# Patient Record
Sex: MALE | Race: WHITE
[De-identification: names, ages, dates, MRNs, and addresses within clinical notes are randomized per-mention and may not be internally consistent; named-entity substitution may affect disease eponyms.]

---

## 2018-03-25 ENCOUNTER — HOSPITAL ENCOUNTER (EMERGENCY)
Dept: HOSPITAL 17 - NEPE | Age: 33
Discharge: HOME | End: 2018-03-25
Payer: COMMERCIAL

## 2018-03-25 VITALS
RESPIRATION RATE: 14 BRPM | DIASTOLIC BLOOD PRESSURE: 71 MMHG | HEART RATE: 102 BPM | TEMPERATURE: 98.4 F | SYSTOLIC BLOOD PRESSURE: 129 MMHG | OXYGEN SATURATION: 96 %

## 2018-03-25 VITALS — WEIGHT: 264.55 LBS | BODY MASS INDEX: 35.83 KG/M2 | HEIGHT: 72 IN

## 2018-03-25 VITALS
OXYGEN SATURATION: 99 % | HEART RATE: 102 BPM | RESPIRATION RATE: 18 BRPM | SYSTOLIC BLOOD PRESSURE: 106 MMHG | DIASTOLIC BLOOD PRESSURE: 59 MMHG

## 2018-03-25 DIAGNOSIS — Z23: ICD-10-CM

## 2018-03-25 DIAGNOSIS — I10: ICD-10-CM
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
## 2018-03-25 DIAGNOSIS — S01.01XA: Primary | ICD-10-CM
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
## 2018-03-25 DIAGNOSIS — F10.129: ICD-10-CM

## 2018-03-25 LAB
BUN SERPL-MCNC: 11 MG/DL (ref 7–18)
CALCIUM SERPL-MCNC: 8.4 MG/DL (ref 8.5–10.1)
CHLORIDE SERPL-SCNC: 104 MEQ/L (ref 98–107)
CREAT SERPL-MCNC: 1.02 MG/DL (ref 0.6–1.3)
GFR SERPLBLD BASED ON 1.73 SQ M-ARVRAT: 83 ML/MIN (ref 89–?)
GLUCOSE SERPL-MCNC: 131 MG/DL (ref 74–106)
HCO3 BLD-SCNC: 24.1 MEQ/L (ref 21–32)
SODIUM SERPL-SCNC: 139 MEQ/L (ref 136–145)

## 2018-03-25 PROCEDURE — 12002 RPR S/N/AX/GEN/TRNK2.6-7.5CM: CPT

## 2018-03-25 PROCEDURE — 80048 BASIC METABOLIC PNL TOTAL CA: CPT

## 2018-03-25 PROCEDURE — 90471 IMMUNIZATION ADMIN: CPT

## 2018-03-25 PROCEDURE — 80307 DRUG TEST PRSMV CHEM ANLYZR: CPT

## 2018-03-25 PROCEDURE — 99284 EMERGENCY DEPT VISIT MOD MDM: CPT

## 2018-03-25 PROCEDURE — 70450 CT HEAD/BRAIN W/O DYE: CPT

## 2018-03-25 PROCEDURE — 72125 CT NECK SPINE W/O DYE: CPT

## 2018-03-25 PROCEDURE — 90714 TD VACC NO PRESV 7 YRS+ IM: CPT

## 2018-03-25 PROCEDURE — 96374 THER/PROPH/DIAG INJ IV PUSH: CPT

## 2018-03-25 NOTE — PD
Physical Exam


Date Seen by Provider:  Mar 25, 2018


Time Seen by Provider:  07:17


Narrative


33-year-old  male previously seen by Dr. Cat, has a laceration 

to the posterior scalp requiring closure.  I was asked to close it.  Please see 

procedure note.





Data


Data


Last Documented VS





Vital Signs








  Date Time  Temp Pulse Resp B/P (MAP) Pulse Ox O2 Delivery O2 Flow Rate FiO2


 


3/25/18 06:00  102 18 106/59 (75) 99 Nasal Cannula 2.00 


 


3/25/18 04:05 98.4       








Orders





 Orders


Ct Brain W/O Iv Contrast(Rout) (3/25/18 )


Ct Cerv Spine W/O Contrast (3/25/18 )


Basic Metabolic Panel (Bmp) (3/25/18 04:03)


Alcohol (Ethanol) (3/25/18 04:03)


Tetanus/Diphtheria Tox Adult (Tetanus/Di (3/25/18 04:15)


Ondansetron Inj (Zofran Inj) (3/25/18 04:15)


Sodium Chlor 0.9% 1000 Ml Inj (Ns 1000 M (3/25/18 04:15)


Ed Discharge Order (3/25/18 05:57)


Lidocai-Epi 1%-1:100,000 Inj (Xylocaine- (3/25/18 07:15)





Labs





Laboratory Tests








Test


  3/25/18


04:10


 


Blood Urea Nitrogen 11 MG/DL 


 


Creatinine 1.02 MG/DL 


 


Random Glucose 131 MG/DL 


 


Calcium Level 8.4 MG/DL 


 


Sodium Level 139 MEQ/L 


 


Potassium Level 3.7 MEQ/L 


 


Chloride Level 104 MEQ/L 


 


Carbon Dioxide Level 24.1 MEQ/L 


 


Anion Gap 11 MEQ/L 


 


Estimat Glomerular Filtration


Rate 83 ML/MIN 


 


 


Ethyl Alcohol Level 275 MG/DL 











[facility]


Medical Record Reviewed:  Yes


Supervised Visit with DACIA:  Yes


Procedures


**Procedure Narrative**


LACERATION


LOCATION: Upper posterior scalp


LENGTH: 3 cm


NUMBER OF STITCHES/STAPLES: 9 staples





REPAIR: The area of the laceration was prepped with Betadine and sterilely 

draped.  The laceration was infiltrated with 3 mL's 1% lidocaine with epi..  

The wound was copiously irrigated and explored without evidence of foreign body

, tendon injury or neurovascular injury.  The wound was closed using staples. 

This was a single layer repair. The patient was advised to keep the wound clean 

and dry. Patient tolerated the procedure well.  Staples should be removed 

within a week.





Diagnosis





 Primary Impression:  


 Closed head injury


 Qualified Codes:  S09.90XA - Unspecified injury of head, initial encounter


 Additional Impressions:  


 Alcohol intoxication


 Qualified Codes:  F10.929 - Alcohol use, unspecified with intoxication, 

unspecified


 Scalp laceration


 Qualified Codes:  S01.01XA - Laceration without foreign body of scalp, initial 

encounter


Patient Instructions:  General Instructions


Departure Forms:  Tests/Procedures





***Additional Instruction:  


Wound care instructions.  Staples removed in 7 days.  Polysporin twice a day.  

Decrease alcohol intake.  Please provide the patient a copy of his blood work 

and CT results at discharge.  Return if symptoms worsen or progress.


Disposition:  01 DISCHARGE HOME


Condition:  Stable











Solo Batista Mar 25, 2018 07:20

## 2018-03-25 NOTE — RADRPT
EXAM DATE/TIME:  03/25/2018 04:30 

 

[facility] COMPARISON:     No previous studies available for comparison.

 

INDICATIONS :     Trauma; fall.

                      

RADIATION DOSE:     45.68 CTDIvol (mGy) ; Patient body habitus

 

 

MEDICAL HISTORY :     None  

SURGICAL HISTORY :      None. 

ENCOUNTER:      Initial

ACUITY:      1 day

PAIN SCALE:      Non-responsive

LOCATION:        neck 

 

TECHNIQUE:     Volumetric scanning of the cervical spine was performed. Multiplanar reconstructions i
n the sagittal, coronal and oblique axial planes were performed.   Using automated exposure control a
nd adjustment of the mA and/or kV according to patient size, radiation dose was kept as low as reason
ably achievable to obtain optimal diagnostic quality images.   DICOM format image data is available e
lectronically for review and comparison.  

 

FINDINGS:

The sagittal reconstructions demonstrate normal alignment and normal prevertebral soft tissues. The d
ens is intact and there is a normal atlantoaxial relationship.

 

The axial images demonstrate that the vertebral bodies and posterior elements are intact. The soft ti
ssues are within normal limits. There is no evidence of acute fracture or malalignment.

 

CONCLUSION:          Negative trauma CT.

 

 Olvin Roberto MD on March 25, 2018 at 4:52           

Board Certified Radiologist.

 This report was verified electronically.

## 2018-03-25 NOTE — RADRPT
EXAM DATE/TIME:  03/25/2018 04:30 

 

[facility] COMPARISON:     

No previous studies available for comparison.

 

 

INDICATIONS :     

Trauma; fall.

                      

 

RADIATION DOSE:     

49.07 CTDIvol (mGy) 

 

 

 

MEDICAL HISTORY :     

None  

 

SURGICAL HISTORY :      

None. 

 

ENCOUNTER:      

Initial

 

ACUITY:      

1 day

 

PAIN SCALE:      

Non-responsive

 

LOCATION:        

cranial 

 

TECHNIQUE:     

Multiple contiguous axial images were obtained of the head.  Using automated exposure control and adj
ustment of the mA and/or kV according to patient size, radiation dose was kept as low as reasonably a
chievable to obtain optimal diagnostic quality images.   DICOM format image data is available electro
nically for review and comparison.  

 

FINDINGS:     

 

CEREBRUM:     

The ventricles are normal for age.  No evidence of midline shift, mass lesion, hemorrhage or acute in
farction.  No extra-axial fluid collections are seen.

 

POSTERIOR FOSSA:     

The cerebellum and brainstem are intact.  The 4th ventricle is midline.  The cerebellopontine angle i
s unremarkable.

 

EXTRACRANIAL:     

The visualized portion of the orbits is intact. Mucosal thickening is noted in the ethmoidal air cell
s.

 

SKULL:     

The calvaria is intact.  No evidence of skull fracture.

 

CONCLUSION:     Negative noncontrast head CT. 

 

 

 Olvin Roberto MD on March 25, 2018 at 4:41           

Board Certified Radiologist.

 This report was verified electronically.

## 2018-03-25 NOTE — PD
HPI


Chief Complaint:  Alcohol/Drug Intoxication


Time Seen by Provider:  04:03


Travel History


International Travel<30 days:  No


Contact w/Intl Traveler<30days:  No


Traveled to known affect area:  No





History of Present Illness


HPI


The patient is a 33-year-old  male who presents to the emergency 

department via EMS for alcohol intoxication and head injury.  According to EMS 

the patient was out with friends earlier tonight, drinking alcohol, when he 

apparently fell backwards striking his head.  EMS states that were bystanders 

on scene and stated the patient did not lose consciousness.  However, EMS 

states in route to the hospital the patient started vomiting.  They noted his 

initial GCS was 14.  Upon arrival the patient was actively vomiting, was 

somewhat lethargic but arousable.  Patient does complain of mild headache and 

nausea with vomiting, denies any chest pain, neck pain, shortness breath, or 

abdominal pain.  Symptoms are moderate.





PFSH


Past Medical History


Arthritis:  No


Asthma:  No


Autoimmune Disease:  No


Blood Disorders:  No


Anxiety:  No


Depression:  No


Heart Rhythm Problems:  No


Cancer:  No


Cardiovascular Problems:  No


High Cholesterol:  No


Chemotherapy:  No


Chest Pain:  No


Congestive Heart Failure:  No


COPD:  No


Cerebrovascular Accident:  No


Diabetes:  No


Diminished Hearing:  No


Endocrine:  No


Gastrointestinal Disorders:  Yes


GERD:  No


Glaucoma:  No


Genitourinary:  Yes


Headaches:  No


Hepatitis:  No


Hiatal Hernia:  No


Hypertension:  Yes


Immune Disorder:  No


Kidney Stones:  Yes (possible kidney stones while in highschool)


Musculoskeletal:  Yes (malignant hyperthermia)


Neurologic:  No


Psychiatric:  No


Reproductive:  No


Respiratory:  No


Migraines:  No


Myocardial Infarction:  No


Radiation Therapy:  No


Renal Failure:  No


Seizures:  No


Sickle Cell Disease:  No


Sleep Apnea:  No


Thyroid Disease:  No


Ulcer:  No


Tetanus Vaccination:  Unknown





Past Surgical History


Abdominal Surgery:  No


AICD:  No


Appendectomy:  No


Arteriovenous Shunt:  No


Cardiac Surgery:  No


Cholecystectomy:  No


Ear Surgery:  No


Endocrine Surgery:  No


Eye Surgery:  No


Genitourinary Surgery:  Yes (cystoscopy while in highschool)


Insulin Pump:  No


Joint Replacement:  No


Neurologic Surgery:  No


Oral Surgery:  No


Pacemaker:  No


Thoracic Surgery:  No


Other Surgery:  Yes (MUSCLE BIOPSY 1990'S)





Social History


Alcohol Use:  Yes (12 pack a week)


Tobacco Use:  No


Substance Use:  No





Allergies-Medications


(Allergen,Severity, Reaction):  


Coded Allergies:  


     Sulfa (Sulfonamide Antibiotics) (Unverified  Allergy, Severe, UNKNOWN, 3/25

/18)


     cefaclor (Unverified  Allergy, Severe, UNKNOWN, 3/25/18)


     desflurane (Unverified  Allergy, Severe, 3/25/18)


 FATHER HAD AN EPISODE OF MALIGNANT HYPERTHERMIA, PT TEST


 INCONCLUSIVE


     etomidate (Unverified  Allergy, Severe, 3/25/18)


 FATHER HAD AN EPISODE OF MALIGNANT HYPERTHERMIA, PT TEST


 INCONCLUSIVE


     isoflurane (Unverified  Allergy, Severe, 3/25/18)


 FATHER HAD AN EPISODE OF MALIGNANT HYPERTHERMIA, PT TEST


 INCONCLUSIVE


     ketamine (Unverified  Allergy, Severe, 3/25/18)


 FATHER HAD AN EPISODE OF MALIGNANT HYPERTHERMIA, PT TEST


 INCONCLUSIVE


     metaxalone (Unverified  Allergy, Severe, UNKNOWN, 3/25/18)


     methohexital (Unverified  Allergy, Severe, 3/25/18)


 FATHER HAD AN EPISODE OF MALIGNANT HYPERTHERMIA, PT TEST


 INCONCLUSIVE


     nitrous oxide (Unverified  Allergy, Severe, 3/25/18)


 FATHER HAD AN EPISODE OF MALIGNANT HYPERTHERMIA, PT TEST


 INCONCLUSIVE


     propofol (Unverified  Allergy, Severe, 3/25/18)


 FATHER HAD AN EPISODE OF MALIGNANT HYPERTHERMIA, PT TEST


 INCONCLUSIVE


     sevoflurane (Unverified  Allergy, Severe, 3/25/18)


 FATHER HAD AN EPISODE OF MALIGNANT HYPERTHERMIA, PT TEST


 INCONCLUSIVE


     shellfish derived (Unverified  Allergy, Severe, THROAT CLOSES, 3/25/18)


     succinylcholine (Unverified  Allergy, Severe, MALIGNANT HYPERTHERMIA, 3/25/

18)


     sufentanil (Unverified  Allergy, Severe, 3/25/18)


 FATHER HAD AN EPISODE OF MALIGNANT HYPERTHERMIA, PT TEST


 INCONCLUSIVE


Reported Meds & Prescriptions





Reported Meds & Active Scripts


Active


Reported


Lisinopril 10 Mg Tab Unknown Dose PO DAILY








Review of Systems


Except as stated in HPI:  all other systems reviewed are Neg


HENT:  Positive: Headaches, No: Neck Pain


Cardiovascular:  No: Chest Pain or Discomfort


Respiratory:  No: Shortness of Breath


Gastrointestinal:  Positive: Nausea, Vomiting, No: Abdominal Pain


Musculoskeletal:  No: Weakness


Neurologic:  No: Weakness


Psychiatric:  Positive: Substance Abuse (alcohol use tonight)





Physical Exam


Narrative


GENERAL: 33-year-old male who appears his stated age and is initially evaluated 

with cervical collar in place.


SKIN: 3 cm transverse laceration to the occipital area with skin avulsion noted.


HEAD: 3 cm transverse laceration to the occipital area with skin avulsion. 


EYES: Pupils equal and round.  3 mm bilateral and reactive.


ENT: No nasal bleeding or discharge.  Breath smells of alcohol.


NECK: Trachea midline. No JVD.  Cervical collar in place.


CARDIOVASCULAR: Regular rate and rhythm.  No murmur appreciated.


RESPIRATORY: No accessory muscle use. Clear to auscultation. Breath sounds 

equal bilaterally. 


GASTROINTESTINAL: Abdomen soft, non-tender, nondistended.  No rebound 

tenderness.


MUSCULOSKELETAL: No obvious deformities. No clubbing.  No cyanosis.  No edema. 


Back: No tenderness over the thoracic or lumbar vertebrae.


NEUROLOGICAL: Awake, lethargic but arousable.  Moves all 4 extremities.  

Oriented to person.


PSYCHIATRIC: Appears intoxicated.





Data


Data


Last Documented VS





Vital Signs








  Date Time  Temp Pulse Resp B/P (MAP) Pulse Ox O2 Delivery O2 Flow Rate FiO2


 


3/25/18 04:05 98.4 102 14 129/71 (90) 96   








Orders





 Orders


Ct Brain W/O Iv Contrast(Rout) (3/25/18 )


Ct Cerv Spine W/O Contrast (3/25/18 )


Basic Metabolic Panel (Bmp) (3/25/18 04:03)


Alcohol (Ethanol) (3/25/18 04:03)


Tetanus/Diphtheria Tox Adult (Tetanus/Di (3/25/18 04:15)


Ondansetron Inj (Zofran Inj) (3/25/18 04:15)


Sodium Chlor 0.9% 1000 Ml Inj (Ns 1000 M (3/25/18 04:15)





Labs





Laboratory Tests








Test


  3/25/18


04:10


 


Blood Urea Nitrogen 11 MG/DL 


 


Creatinine 1.02 MG/DL 


 


Random Glucose 131 MG/DL 


 


Calcium Level 8.4 MG/DL 


 


Sodium Level 139 MEQ/L 


 


Potassium Level 3.7 MEQ/L 


 


Chloride Level 104 MEQ/L 


 


Carbon Dioxide Level 24.1 MEQ/L 


 


Anion Gap 11 MEQ/L 


 


Estimat Glomerular Filtration


Rate 83 ML/MIN 


 


 


Ethyl Alcohol Level 275 MG/DL 











[facility]


Medical Decision Making


Medical Screen Exam Complete:  Yes


Emergency Medical Condition:  Yes


Medical Record Reviewed:  Yes


Interpretation(s)


CT of the brain reveals negative noncontrast head CT


CT of the cervical spine is negative








Laboratory Tests








Test


  3/25/18


04:10


 


Blood Urea Nitrogen 11 MG/DL 


 


Creatinine 1.02 MG/DL 


 


Random Glucose 131 MG/DL 


 


Calcium Level 8.4 MG/DL 


 


Sodium Level 139 MEQ/L 


 


Potassium Level 3.7 MEQ/L 


 


Chloride Level 104 MEQ/L 


 


Carbon Dioxide Level 24.1 MEQ/L 


 


Anion Gap 11 MEQ/L 


 


Estimat Glomerular Filtration


Rate 83 ML/MIN 


 


 


Ethyl Alcohol Level 275 MG/DL 








Differential Diagnosis


Differential diagnosis includes closed head injury, intracranial hemorrhage, 

subarachnoid hemorrhage, skull fracture, concussion, alcoholic gastritis, 

alcohol intoxication, hyponatremia, laceration, contusion, abrasion.


Narrative Course


IV was established, labs are drawn and sent, and the patient was placed on 

cardiac telemetry monitoring and continuous pulse oximetry monitoring.  CT of 

the brain and cervical spine were obtained.  The patient was administered 

Zofran and 1 L normal saline bolus.  Tetanus shot was updated.  Sodium level is 

normal 139.  Alcohol level is elevated at 275.  CT the brain and cervical spine 

are negative.  The patient's laceration was repaired by the mid-level provider, 

please refer to the procedure note.  The patient will be monitored in the 

emergency department until he is awake, alert, and able to ambulate.  He will 

then be discharged home with a safe ride and disposition home.





Diagnosis





 Primary Impression:  


 Closed head injury


 Qualified Codes:  S09.90XA - Unspecified injury of head, initial encounter


 Additional Impressions:  


 Alcohol intoxication


 Qualified Codes:  F10.929 - Alcohol use, unspecified with intoxication, 

unspecified


 Scalp laceration


 Qualified Codes:  S01.01XA - Laceration without foreign body of scalp, initial 

encounter


Patient Instructions:  General Instructions





***Additional Instructions:  


Wound care instructions.  Staples removed in 7 days.  Polysporin twice a day.  

Decrease alcohol intake.  Please provide the patient a copy of his blood work 

and CT results at discharge.  Return if symptoms worsen or progress.


***Med/Other Pt SpecificInfo:  No Change to Meds


Disposition:  01 DISCHARGE HOME


Condition:  Stable











Rodrick Cat MD Mar 25, 2018 04:25